# Patient Record
Sex: FEMALE | Race: WHITE | URBAN - NONMETROPOLITAN AREA
[De-identification: names, ages, dates, MRNs, and addresses within clinical notes are randomized per-mention and may not be internally consistent; named-entity substitution may affect disease eponyms.]

---

## 2022-10-03 PROBLEM — M17.9 OSTEOARTHRITIS OF KNEE: Status: ACTIVE | Noted: 2019-01-29

## 2022-10-03 PROBLEM — K21.9 GASTROESOPHAGEAL REFLUX DISEASE: Status: ACTIVE | Noted: 2022-10-03

## 2022-10-03 PROBLEM — H93.19 SUBJECTIVE TINNITUS: Status: ACTIVE | Noted: 2017-08-17

## 2022-10-03 PROBLEM — F32.A DEPRESSIVE DISORDER: Status: ACTIVE | Noted: 2022-10-03

## 2022-10-03 PROBLEM — M25.569 KNEE PAIN: Status: ACTIVE | Noted: 2022-10-03

## 2022-10-03 PROBLEM — E03.9 HYPOTHYROIDISM: Status: ACTIVE | Noted: 2017-09-15

## 2022-10-03 PROBLEM — R56.9 SEIZURE (HCC): Status: ACTIVE | Noted: 2022-10-03

## 2022-10-03 PROBLEM — M26.629 TEMPOROMANDIBULAR JOINT-PAIN-DYSFUNCTION SYNDROME: Status: ACTIVE | Noted: 2017-08-17

## 2022-10-03 PROBLEM — H91.90 HEARING LOSS: Status: ACTIVE | Noted: 2017-08-17

## 2022-10-03 PROBLEM — S39.012A BACK STRAIN: Status: ACTIVE | Noted: 2022-10-03

## 2022-10-03 PROBLEM — F45.8 BRUXISM (TEETH GRINDING): Status: ACTIVE | Noted: 2017-08-17

## 2022-10-03 RX ORDER — ALPRAZOLAM 0.25 MG/1
1 TABLET ORAL 3 TIMES DAILY
COMMUNITY
End: 2022-10-10 | Stop reason: ALTCHOICE

## 2022-10-03 RX ORDER — TRAZODONE HYDROCHLORIDE 50 MG/1
1 TABLET ORAL DAILY
COMMUNITY
End: 2022-10-10

## 2022-10-03 RX ORDER — LAMOTRIGINE 200 MG/1
1 TABLET ORAL 2 TIMES DAILY
COMMUNITY

## 2022-10-03 RX ORDER — DEXTROAMPHETAMINE SACCHARATE, AMPHETAMINE ASPARTATE MONOHYDRATE, DEXTROAMPHETAMINE SULFATE AND AMPHETAMINE SULFATE 7.5; 7.5; 7.5; 7.5 MG/1; MG/1; MG/1; MG/1
1 CAPSULE, EXTENDED RELEASE ORAL DAILY
COMMUNITY
Start: 2022-04-27 | End: 2022-10-10

## 2022-10-03 RX ORDER — TRAZODONE HYDROCHLORIDE 100 MG/1
1 TABLET ORAL NIGHTLY
COMMUNITY
Start: 2022-07-05

## 2022-10-03 RX ORDER — CETIRIZINE HYDROCHLORIDE 10 MG/1
1 TABLET ORAL DAILY
COMMUNITY
End: 2022-10-10

## 2022-10-03 RX ORDER — CYANOCOBALAMIN 1000 UG/ML
1 INJECTION INTRAMUSCULAR; SUBCUTANEOUS
COMMUNITY
End: 2022-10-10 | Stop reason: ALTCHOICE

## 2022-10-03 RX ORDER — LEVOTHYROXINE SODIUM 0.1 MG/1
1 TABLET ORAL DAILY
COMMUNITY

## 2022-10-03 RX ORDER — ESTRADIOL 1 MG/1
1 TABLET ORAL DAILY
COMMUNITY

## 2022-10-10 ENCOUNTER — OFFICE VISIT (OUTPATIENT)
Dept: NEUROSURGERY | Age: 52
End: 2022-10-10
Payer: COMMERCIAL

## 2022-10-10 VITALS
HEART RATE: 68 BPM | BODY MASS INDEX: 34.96 KG/M2 | HEIGHT: 62 IN | SYSTOLIC BLOOD PRESSURE: 118 MMHG | OXYGEN SATURATION: 99 % | WEIGHT: 190 LBS | DIASTOLIC BLOOD PRESSURE: 66 MMHG

## 2022-10-10 DIAGNOSIS — G40.909 SEIZURE DISORDER (HCC): Primary | ICD-10-CM

## 2022-10-10 PROCEDURE — 99204 OFFICE O/P NEW MOD 45 MIN: CPT | Performed by: PSYCHIATRY & NEUROLOGY

## 2022-10-10 RX ORDER — BUSPIRONE HYDROCHLORIDE 10 MG/1
1 TABLET ORAL PRN
COMMUNITY
Start: 2022-08-08

## 2022-10-10 NOTE — PROGRESS NOTES
[facility] Neurology Office Note      Patient:   Patric Birch  MR#:    [id number]  Account Number:                         YOB: 1970  Date of Evaluation:  10/10/2022  Time of Note:                          2:36 PM  Consulting Physician:  Osvaldo Vargas DO    NEW PATIENT CONSULTATION    Chief Complaint   Patient presents with    New Patient    Seizures     Last seizure was 2013       HISTORY OF PRESENT ILLNESS    Patric Birch is a 47y.o. year old female here for seizure disorder. Seizures started back in , last event was in . Described focal events with a MICHELLE. Describes an aura, strange feeling, followed by confusion, without overt GTC activity or MICHELLE. No history of TBI, no history of meningitis or encephalitis. No family history of seizures. On Lamictal 200 mg bid currently. Well controlled. Tried topamax, trileptal in the past.  Noting more memory difficulty but no clear events. CT head completed recently reportedly normal, no records.        Past Medical History:   Diagnosis Date    Bruxism (teeth grinding)     Depression     GERD (gastroesophageal reflux disease)     Hearing loss     bilateral    Hypothyroidism     Knee pain     Muscle strain of upper back     Osteoarthritis of both knees, unspecified osteoarthritis type     Seizure (HCC)     Temporomandibular joint dysfunction syndrome     Tinnitus        Past Surgical History:   Procedure Laterality Date     SECTION      2006    HYSTEROSCOPY         Family History   Problem Relation Age of Onset    Melanoma Mother     Mitral Valve Prolapse Mother     Other Mother     Heart Failure Father     Stroke Father        Social History     Socioeconomic History    Marital status:      Spouse name: Not on file    Number of children: Not on file    Years of education: Not on file    Highest education level: Not on file   Occupational History    Not on file   Tobacco Use    Smoking status: Never    Smokeless tobacco: Never Vaping Use    Vaping Use: Never used   Substance and Sexual Activity    Alcohol use: Yes     Comment: occ    Drug use: Never    Sexual activity: Yes   Other Topics Concern    Not on file   Social History Narrative    Not on file     Social Determinants of Health     Financial Resource Strain: Not on file   Food Insecurity: Not on file   Transportation Needs: Not on file   Physical Activity: Not on file   Stress: Not on file   Social Connections: Not on file   Intimate Partner Violence: Not on file   Housing Stability: Not on file       Current Outpatient Medications   Medication Sig Dispense Refill    busPIRone (BUSPAR) 10 MG tablet Take 1 tablet by mouth as needed      estradiol (ESTRACE) 1 MG tablet Take 1 mg by mouth daily      lamoTRIgine (LAMICTAL) 200 MG tablet Take 1 tablet by mouth 2 times daily      levothyroxine (SYNTHROID) 100 MCG tablet Take 1 tablet by mouth daily      lisdexamfetamine (VYVANSE) 50 MG capsule Take 1 capsule by mouth daily. traZODone (DESYREL) 100 MG tablet Take 1 tablet by mouth nightly       No current facility-administered medications for this visit.      ALLERGIES   No Known Allergies      REVIEW OF SYSTEMS    Constitutional: []Fever []Sweat []Chills [] Recent Injury [x] Denies all unless marked  HEENT:[]Headache  [] Head Injury/Hearing Loss  [] Sore Throat  [] Ear Ache/Dizziness  [x] Denies all unless marked  Spine:  [] Neck pain  [] Back pain  [] Sciaticia  [x] Denies all unless marked  Cardiovascular:[]Heart Disease []Chest Pain [] Palpitations  [x] Denies all unless marked  Pulmonary: []Shortness of Breath []Cough   [x] Denies all unless marke  Gastrointestinal: []Nausea  []Vomiting  []Abdominal Pain  []Constipation  []Diarrhea  []Dark Bloody Stools  [x] Denies all unless marked  Psychiatric/Behavioral:[] Depression [] Anxiety [x] Denies all unless marked  Genitourinary:   [] Frequency  [] Urgency  [] Incontinence [] Pain with Urination  [x] Denies all unless marked  Extremities: []Pain  []Swelling  [x] Denies all unless marked  Musculoskeletal: [] Muscle Pain  [] Joint Pain  [] Arthritis [] Muscle Cramps [] Muscle Twitches  [x] Denies all unless marked  Sleep: [x] Insomnia [] Snoring [] Restless Legs [] Sleep Apnea  [] Daytime Sleepiness  [x] Denies all unless marked  Skin:[] Rash [] Skin Discoloration [x] Denies all unless marked   Neurological: []Visual Disturbance/Memory Loss [] Loss of Balance [] Slurred Speech/Weakness [] Seizures  [] Vertigo/Dizziness [x] Denies all unless marked         I have reviewed the above ROS with the patient and agree with the ROS as documented above. PHYSICAL EXAM    Constitutional -   /66   Pulse 68   Ht 5' 2\" (1.575 m)   Wt 190 lb (86.2 kg)   SpO2 99%   BMI 34.75 kg/m²   General appearance: No acute distress   EYES -   Conjunctiva normal  Pupillary exam as below, see CN exam in the neurologic exam  ENT-    No scars, masses, or lesions over external nose or ears  Hearing normal bilaterally to finger rub  Cardiovascular -   No clubbing, cyanosis, or edema   Pulmonary-   Good expansion, normal effort without use of accessory muscles  Musculoskeletal -   No significant wasting of muscles noted  Gait as below, see gait exam in the neurologic exam  Muscle strength, tone, stability as below. No bony deformities  Skin -   Warm, dry, and intact to inspection and palpation. No rash, erythema, or pallor  Psychiatric -   Mood, affect, and behavior appear normal    Memory as below see mental status examination in the neurologic exam    NEUROLOGICAL EXAM    Mental status   [x]Awake, alert, oriented   [x]Affect attention and concentration appear appropriate  [x]Recent and remote memory appears unremarkable  [x]Speech normal without dysarthria or aphasia, comprehension and repetition intact.    COMMENTS:    Cranial Nerves [x]No VF deficit to confrontation  [x]PERRLA, EOMI, no nystagmus, conjugate eye movements, no ptosis  [x]Face symmetric  [x]Facial sensation intact  [x]Tongue midline no atrophy or fasciculations present  [x]Palate midline, hearing to finger rub normal bilaterally  [x]Shoulder shrug and SCM testing normal bilaterally  COMMENTS:   Motor   [x]5/5 strength x 4 extremities  [x]Normal bulk and tone  [x]No tremor present  [x]No rigidity or bradykinesia noted  COMMENTS:   Sensory  [x]Sensation intact to light touch, pin prick, vibration, and proprioception BLE  []Sensation intact to light touch, pin prick, vibration, and proprioception BUE  COMMENTS:   Coordination [x]FTN normal bilaterally   []HTS normal bilaterally  []ANDRES normal bilaterally. COMMENTS:   Reflexes  [x]Symmetric and non-pathological  [x]Toes down going bilaterally  [x]No clonus present  COMMENTS:   Gait                  [x]Normal steady gait    []Ataxic    []Spastic     []Magnetic     []Shuffling  COMMENTS:       LABS RECORD AND IMAGING REVIEW (As below and per HPI)      ASSESSMENT:    Vi Baldwin is a 47y.o. year old female here for possible seizure disorder, currently well controlled on Lamictal.  Some memory difficulty noted as well, which seems improved. Underlying anxiety noted as well. Plan further work up. PLAN:  MRI brain   EEG  Continue Lamictal 200 mg bid, side effects including rash discussed. Request recent labs from primary   Epilepsy precautions and seizure first aid discussed.   Driving per [employer], no heights, swimming, tub baths, open flames, or heavy machinery.  69 Jordana Phillips DO  Board Certified Neurology

## 2022-11-11 ENCOUNTER — HOSPITAL ENCOUNTER (OUTPATIENT)
Dept: NEUROLOGY | Age: 52
Discharge: HOME | End: 2022-11-11
Payer: COMMERCIAL

## 2022-11-11 DIAGNOSIS — G40.909 SEIZURE DISORDER (HCC): ICD-10-CM

## 2022-11-11 PROCEDURE — 95819 EEG AWAKE AND ASLEEP: CPT | Performed by: PSYCHIATRY & NEUROLOGY

## 2022-11-11 PROCEDURE — 95816 EEG AWAKE AND DROWSY: CPT

## 2022-11-15 NOTE — PROCEDURES
ADULT OUTPATIENT ELECTROENCEPHALOGRAM REPORT    Patient:   Haydee Glass  MR#:    [id number]  YOB: 1970  Date of Evaluation:  11/11/2022  Primary Physician:     Marietta Stanton MD   Referring Physician:   Erin Ferrell DO      CLINICAL INFORMATION:     This patient is a 47 y.o. female with a history of seizures. MEDICATIONS:     See MAR. RECORDING CONDITIONS:     This EEG was performed utilizing standard International 10-20 System of electrode placement, with additional channels monitored for eye movement. One channel electrocardiogram was monitored. Data was obtained, stored, and interpreted according to ACNS guidelines (J Clin Neurophysiol 2006;23(2):) utilizing referential montage recording, with reformatting to longitudinal, transverse bipolar, and referential montages as necessary for interpretation, along with the digital/automated EEG analysis. Patient tolerated entire procedure well. Photic stimulation and hyperventilation were utilized as activation procedures unless otherwise specified below. E.E.G. DESCRIPTION:     The resting predominant posterior background frequency is a 9-10 Hz 30-40 uV rhythm. No overt focal, lateralizing, or paroxysmal abnormalities were noted through the recording. Drowsiness was demonstrated by slow rolling eye movements followed by a loss of the background waking activities. Onset of stage I sleep was demonstrated by gradual disappearance of background waking rhythms with gradual symmetric mixed frequency 4-7 Hz slowing. Hyperventilation was not performed. Photic stimulation was performed and had little change on the recording. Muscle, motion, and eye movement artifacts were noted. EEG INTERPRETATION:    Normal EEG for age in the awake, drowsy, and sleep states. CLINICAL CORRELATION:     The absence of epileptiform abnormalities does not preclude a clinical diagnosis of seizures.        Erin Ferrell DO  Board Certified Neurologist    Date reported: 11/15/2022  Date signed: 11/15/2022

## 2022-11-30 ENCOUNTER — TELEPHONE (OUTPATIENT)
Dept: NEUROLOGY | Age: 52
End: 2022-11-30

## 2022-11-30 NOTE — TELEPHONE ENCOUNTER
Sera Gooden would like a call to discuss her Imaging results for MRI and EEG.  her preferred call back time is  Anytime

## 2022-12-01 NOTE — TELEPHONE ENCOUNTER
Called patient per Dr Butler Leyden to give her the results she had requested    MRI brain and EEG normal.  Call   Patient understood.

## 2023-02-13 ENCOUNTER — OFFICE VISIT (OUTPATIENT)
Dept: NEUROLOGY | Age: 53
End: 2023-02-13
Payer: COMMERCIAL

## 2023-02-13 VITALS
BODY MASS INDEX: 34.96 KG/M2 | OXYGEN SATURATION: 96 % | DIASTOLIC BLOOD PRESSURE: 68 MMHG | WEIGHT: 190 LBS | SYSTOLIC BLOOD PRESSURE: 108 MMHG | HEART RATE: 72 BPM | HEIGHT: 62 IN

## 2023-02-13 DIAGNOSIS — G40.909 SEIZURE DISORDER (HCC): Primary | ICD-10-CM

## 2023-02-13 PROCEDURE — 99214 OFFICE O/P EST MOD 30 MIN: CPT | Performed by: PSYCHIATRY & NEUROLOGY

## 2023-02-13 RX ORDER — SEMAGLUTIDE 0.25 MG/.5ML
INJECTION, SOLUTION SUBCUTANEOUS
COMMUNITY
Start: 2023-02-08

## 2023-02-13 RX ORDER — DEXTROAMPHETAMINE SACCHARATE, AMPHETAMINE ASPARTATE MONOHYDRATE, DEXTROAMPHETAMINE SULFATE AND AMPHETAMINE SULFATE 7.5; 7.5; 7.5; 7.5 MG/1; MG/1; MG/1; MG/1
1 CAPSULE, EXTENDED RELEASE ORAL DAILY
COMMUNITY

## 2023-02-13 NOTE — PROGRESS NOTES
[facility] Neurology Office Note      Patient:   Francis Arambula  MR#:    [id number]  Account Number:                         YOB: 1970  Date of Evaluation:  2023  Time of Note:                          3:16 PM  Consulting Physician:  Debra Diehl, DO    FOLLOW UP VISIT     Chief Complaint   Patient presents with    Follow-up    Seizures     No seizures to report        HISTORY OF PRESENT ILLNESS    Francis Arambula is a 46y.o. year old female here for seizure disorder. Seizures started back in , last event was in . Described focal events with a MICHELLE. Describes an aura, strange feeling, followed by confusion, without overt GTC activity or MICHELLE. No history of TBI, no history of meningitis or encephalitis. No family history of seizures. On Lamictal 200 mg bid currently. Well controlled. Tried topamax, trileptal in the past.  Noting more memory difficulty but no clear events. CT head completed recently reportedly normal, no records. No events since last seen.  MRI, EEG normal.       Past Medical History:   Diagnosis Date    Bruxism (teeth grinding)     Depression     GERD (gastroesophageal reflux disease)     Hearing loss     bilateral    Hypothyroidism     Knee pain     Muscle strain of upper back     Osteoarthritis of both knees, unspecified osteoarthritis type     Seizure (HCC)     Temporomandibular joint dysfunction syndrome     Tinnitus        Past Surgical History:   Procedure Laterality Date     SECTION      2006    HYSTEROSCOPY         Family History   Problem Relation Age of Onset    Melanoma Mother     Mitral Valve Prolapse Mother     Other Mother     Heart Failure Father     Stroke Father        Social History     Socioeconomic History    Marital status:      Spouse name: Not on file    Number of children: Not on file    Years of education: Not on file    Highest education level: Not on file   Occupational History    Not on file   Tobacco Use    Smoking status: Never    Smokeless tobacco: Never   Vaping Use    Vaping Use: Never used   Substance and Sexual Activity    Alcohol use: Yes     Comment: occ    Drug use: Never    Sexual activity: Yes   Other Topics Concern    Not on file   Social History Narrative    Not on file     Social Determinants of Health     Financial Resource Strain: Not on file   Food Insecurity: Not on file   Transportation Needs: Not on file   Physical Activity: Not on file   Stress: Not on file   Social Connections: Not on file   Intimate Partner Violence: Not on file   Housing Stability: Not on file       Current Outpatient Medications   Medication Sig Dispense Refill    amphetamine-dextroamphetamine (ADDERALL XR) 30 MG extended release capsule Take 1 capsule by mouth daily. WEGOVY 0.25 MG/0.5ML SOAJ SC injection       busPIRone (BUSPAR) 10 MG tablet Take 1 tablet by mouth as needed      estradiol (ESTRACE) 1 MG tablet Take 1 mg by mouth daily      lamoTRIgine (LAMICTAL) 200 MG tablet Take 1 tablet by mouth 2 times daily      levothyroxine (SYNTHROID) 100 MCG tablet Take 1 tablet by mouth daily      traZODone (DESYREL) 100 MG tablet Take 1 tablet by mouth nightly       No current facility-administered medications for this visit.      ALLERGIES   No Known Allergies      REVIEW OF SYSTEMS    Constitutional: []Fever []Sweat []Chills [] Recent Injury [x] Denies all unless marked  HEENT:[]Headache  [] Head Injury/Hearing Loss  [] Sore Throat  [] Ear Ache/Dizziness  [x] Denies all unless marked  Spine:  [] Neck pain  [] Back pain  [] Sciaticia  [x] Denies all unless marked  Cardiovascular:[]Heart Disease []Chest Pain [] Palpitations  [x] Denies all unless marked  Pulmonary: []Shortness of Breath []Cough   [x] Denies all unless marke  Gastrointestinal: []Nausea  []Vomiting  []Abdominal Pain  []Constipation  []Diarrhea  []Dark Bloody Stools  [x] Denies all unless marked  Psychiatric/Behavioral:[] Depression [] Anxiety [x] Denies all unless marked  Genitourinary:   [] Frequency  [] Urgency  [] Incontinence [] Pain with Urination  [x] Denies all unless marked  Extremities: []Pain  []Swelling  [x] Denies all unless marked  Musculoskeletal: [] Muscle Pain  [] Joint Pain  [] Arthritis [] Muscle Cramps [] Muscle Twitches  [x] Denies all unless marked  Sleep: [] Insomnia [] Snoring [] Restless Legs [] Sleep Apnea  [] Daytime Sleepiness  [x] Denies all unless marked  Skin:[] Rash [] Skin Discoloration [x] Denies all unless marked   Neurological: []Visual Disturbance/Memory Loss [] Loss of Balance [] Slurred Speech/Weakness [] Seizures  [] Vertigo/Dizziness [x] Denies all unless marked              I have reviewed the above ROS with the patient and agree with the ROS as documented above. PHYSICAL EXAM    Constitutional -   /68   Pulse 72   Ht 5' 2\" (1.575 m)   Wt 190 lb (86.2 kg)   SpO2 96%   BMI 34.75 kg/m²   General appearance: No acute distress   EYES -   Conjunctiva normal  Pupillary exam as below, see CN exam in the neurologic exam  ENT-    No scars, masses, or lesions over external nose or ears  Hearing normal bilaterally to finger rub  Cardiovascular -   No clubbing, cyanosis, or edema   Pulmonary-   Good expansion, normal effort without use of accessory muscles  Musculoskeletal -   No significant wasting of muscles noted  Gait as below, see gait exam in the neurologic exam  Muscle strength, tone, stability as below. No bony deformities  Skin -   Warm, dry, and intact to inspection and palpation. No rash, erythema, or pallor  Psychiatric -   Mood, affect, and behavior appear normal    Memory as below see mental status examination in the neurologic exam    NEUROLOGICAL EXAM    Mental status   [x]Awake, alert, oriented   [x]Affect attention and concentration appear appropriate  [x]Recent and remote memory appears unremarkable  [x]Speech normal without dysarthria or aphasia, comprehension and repetition intact.    COMMENTS: Cranial Nerves [x]No VF deficit to confrontation  [x]PERRLA, EOMI, no nystagmus, conjugate eye movements, no ptosis  [x]Face symmetric  [x]Facial sensation intact  [x]Tongue midline no atrophy or fasciculations present  [x]Palate midline, hearing to finger rub normal bilaterally  [x]Shoulder shrug and SCM testing normal bilaterally  COMMENTS:   Motor   [x]5/5 strength x 4 extremities  [x]Normal bulk and tone  [x]No tremor present  [x]No rigidity or bradykinesia noted  COMMENTS:   Sensory  [x]Sensation intact to light touch, pin prick, vibration, and proprioception BLE  []Sensation intact to light touch, pin prick, vibration, and proprioception BUE  COMMENTS:   Coordination [x]FTN normal bilaterally   []HTS normal bilaterally  []ANDRES normal bilaterally. COMMENTS:   Reflexes  [x]Symmetric and non-pathological  [x]Toes down going bilaterally  [x]No clonus present  COMMENTS:   Gait                  [x]Normal steady gait    []Ataxic    []Spastic     []Magnetic     []Shuffling  COMMENTS:       LABS RECORD AND IMAGING REVIEW (As below and per HPI)    EEG normal, reviewed. MRI Brain normal, reviewed. ASSESSMENT:    Brandon Flores is a 46y.o. year old female here for possible seizure disorder, currently well controlled on Lamictal.  Some memory difficulty noted as well, which seems improved. Underlying anxiety noted as well. MRI, EEG normal.  No further events since last seen. PLAN:  Continue Lamictal 200 mg bid, side effects including rash discussed. Epilepsy precautions and seizure first aid discussed. Driving per [employer], no heights, swimming, tub baths, open flames, or heavy machinery.       Belva Primrose, DO  Board Certified Neurology